# Patient Record
Sex: FEMALE | Race: WHITE | NOT HISPANIC OR LATINO | Employment: STUDENT | URBAN - METROPOLITAN AREA
[De-identification: names, ages, dates, MRNs, and addresses within clinical notes are randomized per-mention and may not be internally consistent; named-entity substitution may affect disease eponyms.]

---

## 2023-05-08 ENCOUNTER — OFFICE VISIT (OUTPATIENT)
Dept: PODIATRY | Facility: CLINIC | Age: 28
End: 2023-05-08
Payer: COMMERCIAL

## 2023-05-08 VITALS
DIASTOLIC BLOOD PRESSURE: 96 MMHG | HEIGHT: 67 IN | BODY MASS INDEX: 24.83 KG/M2 | WEIGHT: 158.19 LBS | HEART RATE: 91 BPM | SYSTOLIC BLOOD PRESSURE: 141 MMHG

## 2023-05-08 DIAGNOSIS — L60.0 INGROWN NAIL: ICD-10-CM

## 2023-05-08 DIAGNOSIS — L60.9 DISEASE OF NAIL: Primary | ICD-10-CM

## 2023-05-08 PROCEDURE — 1159F MED LIST DOCD IN RCRD: CPT | Mod: S$GLB,,, | Performed by: PODIATRIST

## 2023-05-08 PROCEDURE — 3008F BODY MASS INDEX DOCD: CPT | Mod: S$GLB,,, | Performed by: PODIATRIST

## 2023-05-08 PROCEDURE — 3077F SYST BP >= 140 MM HG: CPT | Mod: S$GLB,,, | Performed by: PODIATRIST

## 2023-05-08 PROCEDURE — 3080F DIAST BP >= 90 MM HG: CPT | Mod: S$GLB,,, | Performed by: PODIATRIST

## 2023-05-08 PROCEDURE — 99203 OFFICE O/P NEW LOW 30 MIN: CPT | Mod: S$GLB,,, | Performed by: PODIATRIST

## 2023-05-08 PROCEDURE — 99203 PR OFFICE/OUTPT VISIT, NEW, LEVL III, 30-44 MIN: ICD-10-PCS | Mod: S$GLB,,, | Performed by: PODIATRIST

## 2023-05-08 PROCEDURE — 3077F PR MOST RECENT SYSTOLIC BLOOD PRESSURE >= 140 MM HG: ICD-10-PCS | Mod: S$GLB,,, | Performed by: PODIATRIST

## 2023-05-08 PROCEDURE — 3008F PR BODY MASS INDEX (BMI) DOCUMENTED: ICD-10-PCS | Mod: S$GLB,,, | Performed by: PODIATRIST

## 2023-05-08 PROCEDURE — 3080F PR MOST RECENT DIASTOLIC BLOOD PRESSURE >= 90 MM HG: ICD-10-PCS | Mod: S$GLB,,, | Performed by: PODIATRIST

## 2023-05-08 PROCEDURE — 1159F PR MEDICATION LIST DOCUMENTED IN MEDICAL RECORD: ICD-10-PCS | Mod: S$GLB,,, | Performed by: PODIATRIST

## 2023-05-08 NOTE — PROGRESS NOTES
Subjective:     Patient ID: Maribel Tim is a 27 y.o. female.    Chief Complaint: Ingrown Toenail    Maribel is a 27 y.o. female who presents to the clinic complaining of thick and discolored toenails on the left foot. Maribel is inquiring about treatment options.  Patient reports history of previous nail procedure to the left 1st digit for a nail fungus.  Patient states the distal medial corner is irritated so she is also concerned about a possible ingrown.  Patient states however that the area might have been irritated from self cleansing of the area and digging underneath the nail plate.    Review of Systems   Constitutional: Negative for chills and fever.   Cardiovascular:  Negative for chest pain and leg swelling.   Respiratory:  Negative for cough and shortness of breath.    Gastrointestinal:  Negative for diarrhea, nausea and vomiting.      Objective:     Physical Exam  Vitals reviewed.   Constitutional:       General: She is not in acute distress.     Appearance: Normal appearance. She is not ill-appearing.   HENT:      Head: Normocephalic.      Nose: Nose normal.   Pulmonary:      Effort: Pulmonary effort is normal. No respiratory distress.   Skin:     Capillary Refill: Capillary refill takes 2 to 3 seconds.   Neurological:      Mental Status: She is alert and oriented to person, place, and time.   Psychiatric:         Mood and Affect: Mood normal.         Behavior: Behavior normal.         Thought Content: Thought content normal.         Judgment: Judgment normal.     Neurologic:  Protective and light touch sensation intact bilateral lower extremity  Vascular: DP and PT pulses palpable 2/4 bilateral foot, capillary fill time less 3 seconds to distal aspect of the digits   Musculoskeletal:  5/5 muscle strength noted bilateral foot, ankle joint range of motion is full without pain, mild tenderness with palpation of the left 1st digit medial nail border   Dermatologic:  Mild incurvation noted at the distal  medial tip left 1st digit nail plate, left 1st digit nail plate shows evidence of previous lysis and is thickened and discolored with hyperkeratotic skin within the medial nail fold, no open lesions noted bilateral foot, no rashes noted bilateral foot, no interdigital maceration noted bilateral foot    Assessment:      Encounter Diagnoses   Name Primary?    Disease of nail Yes    Ingrown nail      Plan:     Maribel was seen today for ingrown toenail.    Diagnoses and all orders for this visit:    Disease of nail    Ingrown nail      I counseled the patient on her conditions, their implications and medical management.        1. Patient was examined and evaluated.    2. Discussed patient etiology of fungal nail changes.  Discussed treatments for nail fungus with the patient including oral Lamisil therapy its risks and benefits, Penlac and Jublia topical prescription medications, and tea tree oil and Vicks vapor rub for OTC conservative treatment options.    3. Discussed with patient etiology of ingrown toenail.  Discussed and described the partial nonpermanent ingrown toenail procedure.  Patient did have a slant back performed today.  Patient was advised to apply ointment/moisturizer to the medial nail fold left 1st digit for improvement of dry skin.  Patient was warned of potential recurrence over time.    4. Patient will continue with comfortable shoe gear both inside and outside the home   5. Patient will follow-up p.r.n. for complaints